# Patient Record
Sex: MALE | Race: WHITE | NOT HISPANIC OR LATINO
[De-identification: names, ages, dates, MRNs, and addresses within clinical notes are randomized per-mention and may not be internally consistent; named-entity substitution may affect disease eponyms.]

---

## 2023-07-21 ENCOUNTER — APPOINTMENT (OUTPATIENT)
Dept: ORTHOPEDIC SURGERY | Facility: CLINIC | Age: 63
End: 2023-07-21
Payer: OTHER MISCELLANEOUS

## 2023-07-21 VITALS — HEIGHT: 66 IN | BODY MASS INDEX: 34.55 KG/M2 | WEIGHT: 215 LBS

## 2023-07-21 DIAGNOSIS — Z78.9 OTHER SPECIFIED HEALTH STATUS: ICD-10-CM

## 2023-07-21 PROBLEM — Z00.00 ENCOUNTER FOR PREVENTIVE HEALTH EXAMINATION: Status: ACTIVE | Noted: 2023-07-21

## 2023-07-21 PROCEDURE — 73562 X-RAY EXAM OF KNEE 3: CPT | Mod: RT

## 2023-07-21 PROCEDURE — 20610 DRAIN/INJ JOINT/BURSA W/O US: CPT | Mod: RT

## 2023-07-21 PROCEDURE — 99203 OFFICE O/P NEW LOW 30 MIN: CPT | Mod: 25

## 2023-07-21 NOTE — HISTORY OF PRESENT ILLNESS
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  pain located lateral and medial.  associated sense of instability.  worse sit to stand and stairs, better at rest. Limping. has tried moist heat and topicals.Has been OOW since.\par \par PMH: NO prior knee issues

## 2023-07-21 NOTE — WORK
[Other: ___] : [unfilled] [Was the competent medical cause of the injury] : was the competent medical cause of the injury [Consistent with my objective findings] : consistent with my objective findings [Total (100%)] : total (100%) [Does not reveal pre-existing condition(s) that may affect treatment/prognosis] : does not reveal pre-existing condition(s) that may affect treatment/prognosis [Cannot return to work because ________] : cannot return to work because [unfilled] [Bending/Twisting] : bending/twisting [Kneeling] : kneeling [I provided the services listed above] :  I provided the services listed above. [FreeTextEntry1] : uncertain

## 2023-07-21 NOTE — PHYSICAL EXAM
[Normal Mood and Affect] : normal mood and affect [Able to Communicate] : able to communicate [Well Developed] : well developed [Well Nourished] : well nourished [Right] : right knee [NL (0)] : extension 0 degrees [5___] : hamstring 5[unfilled]/5 [] : negative Lachmann [TWNoteComboBox7] : flexion 130 degrees

## 2023-08-04 ENCOUNTER — APPOINTMENT (OUTPATIENT)
Dept: ORTHOPEDIC SURGERY | Facility: CLINIC | Age: 63
End: 2023-08-04
Payer: OTHER MISCELLANEOUS

## 2023-08-04 VITALS — WEIGHT: 215 LBS | HEIGHT: 66 IN | BODY MASS INDEX: 34.55 KG/M2

## 2023-08-04 DIAGNOSIS — M23.91 UNSPECIFIED INTERNAL DERANGEMENT OF RIGHT KNEE: ICD-10-CM

## 2023-08-04 DIAGNOSIS — Z78.9 OTHER SPECIFIED HEALTH STATUS: ICD-10-CM

## 2023-08-04 DIAGNOSIS — Z87.891 PERSONAL HISTORY OF NICOTINE DEPENDENCE: ICD-10-CM

## 2023-08-04 DIAGNOSIS — Z86.39 PERSONAL HISTORY OF OTHER ENDOCRINE, NUTRITIONAL AND METABOLIC DISEASE: ICD-10-CM

## 2023-08-04 DIAGNOSIS — I10 ESSENTIAL (PRIMARY) HYPERTENSION: ICD-10-CM

## 2023-08-04 DIAGNOSIS — E78.00 PURE HYPERCHOLESTEROLEMIA, UNSPECIFIED: ICD-10-CM

## 2023-08-04 PROCEDURE — 20610 DRAIN/INJ JOINT/BURSA W/O US: CPT | Mod: RT

## 2023-08-04 PROCEDURE — 99213 OFFICE O/P EST LOW 20 MIN: CPT | Mod: 25

## 2023-08-04 RX ORDER — LISINOPRIL 30 MG/1
TABLET ORAL
Refills: 0 | Status: ACTIVE | COMMUNITY

## 2023-08-04 RX ORDER — ATORVASTATIN CALCIUM 80 MG/1
TABLET, FILM COATED ORAL
Refills: 0 | Status: ACTIVE | COMMUNITY

## 2023-08-04 RX ORDER — LEVOTHYROXINE SODIUM 200 UG/1
TABLET ORAL
Refills: 0 | Status: ACTIVE | COMMUNITY

## 2023-08-04 NOTE — PLAN
[TextEntry] : The patient was advised of the diagnosis. The natural history of the pathology was explained in full to the patient in layman's terms. All questions were answered. The risks and benefits of surgical and non-surgical treatment alternatives were explained in full to the patient. Obtain MRI rt knee Peat cortisone injection.  Patient is being referred for physical therapy for various modalities.

## 2023-08-04 NOTE — PROCEDURE
[Large Joint Injection] : Large joint injection [Right] : of the right [Knee] : knee [Pain] : pain [Inflammation] : inflammation [Betadine] : betadine [Ethyl Chloride sprayed topically] : ethyl chloride sprayed topically [___ cc    1%] : Lidocaine ~Vcc of 1%  [___ cc    40mg] : Methylprednisolone (Depomedrol) ~Vcc of 40 mg  [] : Patient tolerated procedure well [Call if redness, pain or fever occur] : call if redness, pain or fever occur [Apply ice for 15min out of every hour for the next 12-24 hours as tolerated] : apply ice for 15 minutes out of every hour for the next 12-24 hours as tolerated [Risks, benefits, alternatives discussed / Verbal consent obtained] : the risks benefits, and alternatives have been discussed, and verbal consent was obtained

## 2023-08-04 NOTE — HISTORY OF PRESENT ILLNESS
[7] : 7 [0] : 0 [Dull/Aching] : dull/aching [Sharp] : sharp [Stabbing] : stabbing [Not working due to injury] : Work status: not working due to injury [de-identified] :  07/19/23 08/04/23:   F/U.  Is now 2 1/2 weeks after injury to his right knee at work.  Still c/o persistent rt knee pain despite a cortisone injection x 2 weeks ago. Still OOW,totally disabled.  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  pain located lateral and medial.  associated sense of instability.  worse sit to stand and stairs, better at rest. Limping. has tried moist heat and topicals.Has been OOW since.  PMH: NO prior knee issues [] : no [FreeTextEntry1] : right knee [de-identified] : none

## 2023-08-07 ENCOUNTER — TRANSCRIPTION ENCOUNTER (OUTPATIENT)
Age: 63
End: 2023-08-07

## 2023-08-07 ENCOUNTER — APPOINTMENT (OUTPATIENT)
Dept: MRI IMAGING | Facility: CLINIC | Age: 63
End: 2023-08-07
Payer: OTHER MISCELLANEOUS

## 2023-08-07 PROCEDURE — 73721 MRI JNT OF LWR EXTRE W/O DYE: CPT | Mod: RT

## 2023-08-18 ENCOUNTER — APPOINTMENT (OUTPATIENT)
Dept: ORTHOPEDIC SURGERY | Facility: CLINIC | Age: 63
End: 2023-08-18
Payer: OTHER MISCELLANEOUS

## 2023-08-18 PROCEDURE — 99213 OFFICE O/P EST LOW 20 MIN: CPT

## 2023-08-18 RX ADMIN — Medication 2 MG/2ML: at 00:00

## 2023-08-18 NOTE — HISTORY OF PRESENT ILLNESS
[7] : 7 [0] : 0 [Dull/Aching] : dull/aching [Sharp] : sharp [Stabbing] : stabbing [Not working due to injury] : Work status: not working due to injury [de-identified] :  07/19/23 08/18/23:   F/U.  Returns today for right knee MRI results.  Has not yet started PT.  Continues to be symptomatic and has difficulty walking up steps.  Is not working.  IMPRESSION: 1.  Medial meniscal tear. 2.  Lateral meniscal tear. 3.  Tricompartmental arthrosis with joint effusion.  15 mm ganglion overlying the popliteus tendon sheath with loose bodies largest 5 mm in the ganglion. 4.  ACL mucoid with partial proximal tear. 5.  Hamstring and gastrocnemius tendinopathy with medial bursitis posterior to the femur.  4 cm popliteal cyst.   08/04/23:   F/U.  Is now 2 1/2 weeks after injury to his right knee at work.  Still c/o persistent rt knee pain despite a cortisone injection x 2 weeks ago. Still OOW,totally disabled.  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  pain located lateral and medial.  associated sense of instability.  worse sit to stand and stairs, better at rest. Limping. has tried moist heat and topicals.Has been OOW since.  PMH: NO prior knee issues [] : no [FreeTextEntry1] : right knee [de-identified] : 08/04/23 [de-identified] : [place] [de-identified] : x-ray, MRI [de-identified] : none

## 2023-08-18 NOTE — PLAN
[TextEntry] : The natural progression of osteoarthritis was explained to the patient.  We discussed the possible treatment options from conservative to operative.  These included NSAIDs, Glucosamine and Chondroitin sulfate, and physical therapy as well different types of injections.  We also discussed that at some point they may progress to need a TKA.  Information and pamphlets were given.   MRI results reviewed.  He will start PT.   Pt has requested to start viscosupplementation once authorization is obtained.

## 2023-08-18 NOTE — DISCUSSION/SUMMARY
[de-identified] : "Written by Eliza Rebollar, acting as Scribe for Rickey Fan MD."  Dr. Fan -  The documentation recorded by the scribe accurately reflects the service I personally performed and the decisions made by me.

## 2023-09-15 ENCOUNTER — APPOINTMENT (OUTPATIENT)
Dept: ORTHOPEDIC SURGERY | Facility: CLINIC | Age: 63
End: 2023-09-15

## 2023-10-17 ENCOUNTER — APPOINTMENT (OUTPATIENT)
Dept: ORTHOPEDIC SURGERY | Facility: CLINIC | Age: 63
End: 2023-10-17
Payer: OTHER MISCELLANEOUS

## 2023-10-17 PROCEDURE — 20610 DRAIN/INJ JOINT/BURSA W/O US: CPT | Mod: RT

## 2023-10-17 PROCEDURE — 99213 OFFICE O/P EST LOW 20 MIN: CPT | Mod: 25

## 2023-10-24 ENCOUNTER — APPOINTMENT (OUTPATIENT)
Dept: ORTHOPEDIC SURGERY | Facility: CLINIC | Age: 63
End: 2023-10-24
Payer: OTHER MISCELLANEOUS

## 2023-10-24 VITALS — HEIGHT: 66 IN | BODY MASS INDEX: 34.55 KG/M2 | WEIGHT: 215 LBS

## 2023-10-24 PROCEDURE — 99213 OFFICE O/P EST LOW 20 MIN: CPT | Mod: 25

## 2023-10-24 PROCEDURE — 20610 DRAIN/INJ JOINT/BURSA W/O US: CPT | Mod: RT

## 2023-10-31 ENCOUNTER — APPOINTMENT (OUTPATIENT)
Dept: ORTHOPEDIC SURGERY | Facility: CLINIC | Age: 63
End: 2023-10-31
Payer: OTHER MISCELLANEOUS

## 2023-10-31 PROCEDURE — 20610 DRAIN/INJ JOINT/BURSA W/O US: CPT | Mod: RT

## 2023-10-31 PROCEDURE — 99213 OFFICE O/P EST LOW 20 MIN: CPT | Mod: 25

## 2023-11-07 ENCOUNTER — APPOINTMENT (OUTPATIENT)
Dept: ORTHOPEDIC SURGERY | Facility: CLINIC | Age: 63
End: 2023-11-07
Payer: OTHER MISCELLANEOUS

## 2023-11-07 PROCEDURE — L1833: CPT | Mod: RT

## 2023-11-07 PROCEDURE — 99213 OFFICE O/P EST LOW 20 MIN: CPT | Mod: 25

## 2023-11-07 PROCEDURE — 20610 DRAIN/INJ JOINT/BURSA W/O US: CPT | Mod: RT

## 2023-12-18 ENCOUNTER — APPOINTMENT (OUTPATIENT)
Dept: ORTHOPEDIC SURGERY | Facility: CLINIC | Age: 63
End: 2023-12-18
Payer: OTHER MISCELLANEOUS

## 2023-12-18 VITALS — HEIGHT: 66 IN | WEIGHT: 220 LBS | BODY MASS INDEX: 35.36 KG/M2

## 2023-12-18 DIAGNOSIS — M17.11 UNILATERAL PRIMARY OSTEOARTHRITIS, RIGHT KNEE: ICD-10-CM

## 2023-12-18 DIAGNOSIS — M23.203 DERANGEMENT OF UNSPECIFIED MEDIAL MENISCUS DUE TO OLD TEAR OR INJURY, RIGHT KNEE: ICD-10-CM

## 2023-12-18 DIAGNOSIS — M23.300 OTHER MENISCUS DERANGEMENTS, UNSPECIFIED LATERAL MENISCUS, RIGHT KNEE: ICD-10-CM

## 2023-12-18 PROCEDURE — 99213 OFFICE O/P EST LOW 20 MIN: CPT

## 2023-12-18 NOTE — WORK
[Other: ___] : [unfilled] [Was the competent medical cause of the injury] : was the competent medical cause of the injury [Consistent with my objective findings] : consistent with my objective findings [Does not reveal pre-existing condition(s) that may affect treatment/prognosis] : does not reveal pre-existing condition(s) that may affect treatment/prognosis [Bending/Twisting] : bending/twisting [Kneeling] : kneeling [I provided the services listed above] :  I provided the services listed above. [Partial] : partial [Can return to work without limitations on ______] : can return to work without limitations on [unfilled] [FreeTextEntry1] : uncertain

## 2023-12-18 NOTE — PLAN
[TextEntry] : The natural progression of osteoarthritis was explained to the patient.  We discussed the possible treatment options from conservative to operative.  These included NSAIDs, Glucosamine and Chondroitin sulfate, and physical therapy as well different types of injections.  We also discussed that at some point they may progress to need a TKA.  Information and pamphlets were given.   Continues working full duty despite his complaints.  Recommend over the counter medications on as needed basis.

## 2023-12-18 NOTE — HISTORY OF PRESENT ILLNESS
[Work related] : work related [4] : 4 [0] : 0 [Dull/Aching] : dull/aching [Intermittent] : intermittent [Rest] : rest [Walking] : walking [Stairs] : stairs [Full time] : Work status: full time [de-identified] :  07/19/23 12/18/23:   F/U.  Is five months after right knee injury at work.Still c/o some rt knee pain.  Wearing knee brace.Finished Orthovisc injections about 6 weeks ago.Back at work full duty.  11/07/23:   F/U.  Returns for Orthovisc #4, right knee. Feeling still about 75-80% improvement.  Would like to return to work.   10/31/23:   F/U.  Here for Orthovisc #3, right knee.   Feeling about 80% relief.  10/24/23:   F/U.  Here for Orthovisc injection #2 right knee.   10/17/23:   F/U.  Is here today to begin Orthovisc injections, #1 right knee.   Is 2 months after injury.  08/18/23:   F/U.  Returns today for right knee MRI results.  Has not yet started PT.  Continues to be symptomatic and has difficulty walking up steps.  Is not working.  IMPRESSION: 1.  Medial meniscal tear. 2.  Lateral meniscal tear. 3.  Tricompartmental arthrosis with joint effusion.  15 mm ganglion overlying the popliteus tendon sheath with loose bodies largest 5 mm in the ganglion. 4.  ACL mucoid with partial proximal tear. 5.  Hamstring and gastrocnemius tendinopathy with medial bursitis posterior to the femur.  4 cm popliteal cyst.   08/04/23:   F/U.  Is now 2 1/2 weeks after injury to his right knee at work.  Still c/o persistent rt knee pain despite a cortisone injection x 2 weeks ago. Still OOW,totally disabled.  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  pain located lateral and medial.  associated sense of instability.  worse sit to stand and stairs, better at rest. Limping. has tried moist heat and topicals.Has been OOW since.  PMH: NO prior knee issues [FreeTextEntry1] : right knee [FreeTextEntry3] : 719/23